# Patient Record
Sex: MALE | Race: ASIAN | NOT HISPANIC OR LATINO | URBAN - METROPOLITAN AREA
[De-identification: names, ages, dates, MRNs, and addresses within clinical notes are randomized per-mention and may not be internally consistent; named-entity substitution may affect disease eponyms.]

---

## 2018-11-01 ENCOUNTER — EMERGENCY (EMERGENCY)
Facility: HOSPITAL | Age: 43
LOS: 1 days | Discharge: HOME | End: 2018-11-01
Attending: EMERGENCY MEDICINE
Payer: COMMERCIAL

## 2018-11-01 VITALS
WEIGHT: 154.98 LBS | TEMPERATURE: 98 F | RESPIRATION RATE: 17 BRPM | DIASTOLIC BLOOD PRESSURE: 72 MMHG | SYSTOLIC BLOOD PRESSURE: 125 MMHG | OXYGEN SATURATION: 99 % | HEART RATE: 83 BPM | HEIGHT: 68 IN

## 2018-11-01 PROCEDURE — 99283 EMERGENCY DEPT VISIT LOW MDM: CPT

## 2018-11-01 PROCEDURE — 12001 RPR S/N/AX/GEN/TRNK 2.5CM/<: CPT

## 2018-11-02 PROCEDURE — 90471 IMMUNIZATION ADMIN: CPT

## 2018-11-02 PROCEDURE — 12001 RPR S/N/AX/GEN/TRNK 2.5CM/<: CPT

## 2018-11-02 PROCEDURE — 90715 TDAP VACCINE 7 YRS/> IM: CPT

## 2018-11-02 PROCEDURE — 99283 EMERGENCY DEPT VISIT LOW MDM: CPT | Mod: 25

## 2018-11-02 RX ORDER — TETANUS TOXOID, REDUCED DIPHTHERIA TOXOID AND ACELLULAR PERTUSSIS VACCINE, ADSORBED 5; 2.5; 8; 8; 2.5 [IU]/.5ML; [IU]/.5ML; UG/.5ML; UG/.5ML; UG/.5ML
0.5 SUSPENSION INTRAMUSCULAR ONCE
Qty: 0 | Refills: 0 | Status: COMPLETED | OUTPATIENT
Start: 2018-11-02 | End: 2018-11-02

## 2018-11-02 RX ADMIN — TETANUS TOXOID, REDUCED DIPHTHERIA TOXOID AND ACELLULAR PERTUSSIS VACCINE, ADSORBED 0.5 MILLILITER(S): 5; 2.5; 8; 8; 2.5 SUSPENSION INTRAMUSCULAR at 00:31

## 2018-11-02 NOTE — ED ADULT NURSE NOTE - INTEGUMENTARY WDL
Color consistent with ethnicity/race, warm, except for lac to right hand middle finger, dry intact, resilient.

## 2018-11-02 NOTE — ED PROVIDER NOTE - OBJECTIVE STATEMENT
44 y/o male.  tetanus NOT uptodate. presents with laceration to dorsal aspect of middle finger over the DIP joint while cutting with a clean knife.

## 2018-11-02 NOTE — ED PROVIDER NOTE - NSFOLLOWUPINSTRUCTIONS_ED_ALL_ED_FT
keep wound clean and dry. bacitracin twice a day.  keep finger in splint  Call Dr Gabriel (hand surgery) : [phone]

## 2018-11-02 NOTE — ED ADULT NURSE NOTE - CHPI ED NUR SYMPTOMS NEG
no weakness/no dizziness/no fever/no decreased eating/drinking/no nausea/no tingling/no vomiting/no chills

## 2021-02-22 ENCOUNTER — INPATIENT (INPATIENT)
Facility: HOSPITAL | Age: 46
LOS: 1 days | Discharge: HOME | DRG: 177 | End: 2021-02-24
Attending: HOSPITALIST | Admitting: HOSPITALIST
Payer: COMMERCIAL

## 2021-02-22 VITALS
HEART RATE: 93 BPM | RESPIRATION RATE: 16 BRPM | OXYGEN SATURATION: 91 % | HEIGHT: 68 IN | SYSTOLIC BLOOD PRESSURE: 102 MMHG | TEMPERATURE: 101 F | DIASTOLIC BLOOD PRESSURE: 70 MMHG

## 2021-02-22 PROCEDURE — 99285 EMERGENCY DEPT VISIT HI MDM: CPT

## 2021-02-22 RX ORDER — KETOROLAC TROMETHAMINE 30 MG/ML
15 SYRINGE (ML) INJECTION ONCE
Refills: 0 | Status: DISCONTINUED | OUTPATIENT
Start: 2021-02-22 | End: 2021-02-22

## 2021-02-22 RX ORDER — SODIUM CHLORIDE 9 MG/ML
1000 INJECTION INTRAMUSCULAR; INTRAVENOUS; SUBCUTANEOUS ONCE
Refills: 0 | Status: COMPLETED | OUTPATIENT
Start: 2021-02-22 | End: 2021-02-22

## 2021-02-22 RX ORDER — DEXAMETHASONE 0.5 MG/5ML
10 ELIXIR ORAL ONCE
Refills: 0 | Status: COMPLETED | OUTPATIENT
Start: 2021-02-22 | End: 2021-02-22

## 2021-02-23 DIAGNOSIS — B34.2 CORONAVIRUS INFECTION, UNSPECIFIED: ICD-10-CM

## 2021-02-23 DIAGNOSIS — E78.5 HYPERLIPIDEMIA, UNSPECIFIED: ICD-10-CM

## 2021-02-23 DIAGNOSIS — E87.6 HYPOKALEMIA: ICD-10-CM

## 2021-02-23 DIAGNOSIS — Z29.9 ENCOUNTER FOR PROPHYLACTIC MEASURES, UNSPECIFIED: ICD-10-CM

## 2021-02-23 DIAGNOSIS — U07.1 COVID-19: ICD-10-CM

## 2021-02-23 DIAGNOSIS — D64.9 ANEMIA, UNSPECIFIED: ICD-10-CM

## 2021-02-23 DIAGNOSIS — J96.01 ACUTE RESPIRATORY FAILURE WITH HYPOXIA: ICD-10-CM

## 2021-02-23 DIAGNOSIS — E87.1 HYPO-OSMOLALITY AND HYPONATREMIA: ICD-10-CM

## 2021-02-23 LAB
24R-OH-CALCIDIOL SERPL-MCNC: 32.2 NG/ML — SIGNIFICANT CHANGE UP (ref 30–80)
A1C WITH ESTIMATED AVERAGE GLUCOSE RESULT: 6 % — HIGH (ref 4–5.6)
ALBUMIN SERPL ELPH-MCNC: 3 G/DL — LOW (ref 3.5–5)
ALP SERPL-CCNC: 44 U/L — SIGNIFICANT CHANGE UP (ref 40–120)
ALT FLD-CCNC: 24 U/L DA — SIGNIFICANT CHANGE UP (ref 10–60)
ANION GAP SERPL CALC-SCNC: 6 MMOL/L — SIGNIFICANT CHANGE UP (ref 5–17)
ANION GAP SERPL CALC-SCNC: 7 MMOL/L — SIGNIFICANT CHANGE UP (ref 5–17)
APTT BLD: 39.9 SEC — HIGH (ref 27.5–35.5)
AST SERPL-CCNC: 19 U/L — SIGNIFICANT CHANGE UP (ref 10–40)
BASOPHILS # BLD AUTO: 0 K/UL — SIGNIFICANT CHANGE UP (ref 0–0.2)
BASOPHILS NFR BLD AUTO: 0 % — SIGNIFICANT CHANGE UP (ref 0–2)
BILIRUB SERPL-MCNC: 0.5 MG/DL — SIGNIFICANT CHANGE UP (ref 0.2–1.2)
BUN SERPL-MCNC: 9 MG/DL — SIGNIFICANT CHANGE UP (ref 7–18)
BUN SERPL-MCNC: 9 MG/DL — SIGNIFICANT CHANGE UP (ref 7–18)
CALCIUM SERPL-MCNC: 7.8 MG/DL — LOW (ref 8.4–10.5)
CALCIUM SERPL-MCNC: 8.2 MG/DL — LOW (ref 8.4–10.5)
CHLORIDE SERPL-SCNC: 104 MMOL/L — SIGNIFICANT CHANGE UP (ref 96–108)
CHLORIDE SERPL-SCNC: 98 MMOL/L — SIGNIFICANT CHANGE UP (ref 96–108)
CHOLEST SERPL-MCNC: 124 MG/DL — SIGNIFICANT CHANGE UP
CO2 SERPL-SCNC: 26 MMOL/L — SIGNIFICANT CHANGE UP (ref 22–31)
CO2 SERPL-SCNC: 27 MMOL/L — SIGNIFICANT CHANGE UP (ref 22–31)
CREAT SERPL-MCNC: 0.76 MG/DL — SIGNIFICANT CHANGE UP (ref 0.5–1.3)
CREAT SERPL-MCNC: 0.8 MG/DL — SIGNIFICANT CHANGE UP (ref 0.5–1.3)
CRP SERPL-MCNC: 3.65 MG/DL — HIGH (ref 0–0.4)
D DIMER BLD IA.RAPID-MCNC: 198 NG/ML DDU — SIGNIFICANT CHANGE UP
EOSINOPHIL # BLD AUTO: 0 K/UL — SIGNIFICANT CHANGE UP (ref 0–0.5)
EOSINOPHIL NFR BLD AUTO: 0 % — SIGNIFICANT CHANGE UP (ref 0–6)
ERYTHROCYTE [SEDIMENTATION RATE] IN BLOOD: 37 MM/HR — HIGH (ref 0–15)
ESTIMATED AVERAGE GLUCOSE: 126 MG/DL — HIGH (ref 68–114)
FERRITIN SERPL-MCNC: 655 NG/ML — HIGH (ref 30–400)
FERRITIN SERPL-MCNC: 662 NG/ML — HIGH (ref 30–400)
FOLATE SERPL-MCNC: 15 NG/ML — SIGNIFICANT CHANGE UP
GLUCOSE SERPL-MCNC: 107 MG/DL — HIGH (ref 70–99)
GLUCOSE SERPL-MCNC: 133 MG/DL — HIGH (ref 70–99)
HCT VFR BLD CALC: 38 % — LOW (ref 39–50)
HCT VFR BLD CALC: 38.2 % — LOW (ref 39–50)
HDLC SERPL-MCNC: 44 MG/DL — SIGNIFICANT CHANGE UP
HGB BLD-MCNC: 12.7 G/DL — LOW (ref 13–17)
HGB BLD-MCNC: 13 G/DL — SIGNIFICANT CHANGE UP (ref 13–17)
INR BLD: 1.13 RATIO — SIGNIFICANT CHANGE UP (ref 0.88–1.16)
IRON SATN MFR SERPL: 21 UG/DL — LOW (ref 65–170)
IRON SATN MFR SERPL: 8 % — LOW (ref 20–55)
LDH SERPL L TO P-CCNC: 273 U/L — HIGH (ref 120–225)
LIPID PNL WITH DIRECT LDL SERPL: 70 MG/DL — SIGNIFICANT CHANGE UP
LYMPHOCYTES # BLD AUTO: 0.74 K/UL — LOW (ref 1–3.3)
LYMPHOCYTES # BLD AUTO: 13 % — SIGNIFICANT CHANGE UP (ref 13–44)
MAGNESIUM SERPL-MCNC: 2.2 MG/DL — SIGNIFICANT CHANGE UP (ref 1.6–2.6)
MANUAL SMEAR VERIFICATION: SIGNIFICANT CHANGE UP
MCHC RBC-ENTMCNC: 29.1 PG — SIGNIFICANT CHANGE UP (ref 27–34)
MCHC RBC-ENTMCNC: 31.1 PG — SIGNIFICANT CHANGE UP (ref 27–34)
MCHC RBC-ENTMCNC: 33.2 GM/DL — SIGNIFICANT CHANGE UP (ref 32–36)
MCHC RBC-ENTMCNC: 34.2 GM/DL — SIGNIFICANT CHANGE UP (ref 32–36)
MCV RBC AUTO: 87.6 FL — SIGNIFICANT CHANGE UP (ref 80–100)
MCV RBC AUTO: 90.9 FL — SIGNIFICANT CHANGE UP (ref 80–100)
MONOCYTES # BLD AUTO: 0.51 K/UL — SIGNIFICANT CHANGE UP (ref 0–0.9)
MONOCYTES NFR BLD AUTO: 9 % — SIGNIFICANT CHANGE UP (ref 2–14)
NEUTROPHILS # BLD AUTO: 4.04 K/UL — SIGNIFICANT CHANGE UP (ref 1.8–7.4)
NEUTROPHILS NFR BLD AUTO: 71 % — SIGNIFICANT CHANGE UP (ref 43–77)
NON HDL CHOLESTEROL: 80 MG/DL — SIGNIFICANT CHANGE UP
NRBC # BLD: 0 /100 WBCS — SIGNIFICANT CHANGE UP (ref 0–0)
NRBC # BLD: 0 /100 — SIGNIFICANT CHANGE UP (ref 0–0)
PHOSPHATE SERPL-MCNC: 3 MG/DL — SIGNIFICANT CHANGE UP (ref 2.5–4.5)
PLAT MORPH BLD: NORMAL — SIGNIFICANT CHANGE UP
PLATELET # BLD AUTO: 169 K/UL — SIGNIFICANT CHANGE UP (ref 150–400)
PLATELET # BLD AUTO: 179 K/UL — SIGNIFICANT CHANGE UP (ref 150–400)
POTASSIUM SERPL-MCNC: 3.4 MMOL/L — LOW (ref 3.5–5.3)
POTASSIUM SERPL-MCNC: 4.3 MMOL/L — SIGNIFICANT CHANGE UP (ref 3.5–5.3)
POTASSIUM SERPL-SCNC: 3.4 MMOL/L — LOW (ref 3.5–5.3)
POTASSIUM SERPL-SCNC: 4.3 MMOL/L — SIGNIFICANT CHANGE UP (ref 3.5–5.3)
PROCALCITONIN SERPL-MCNC: 0.04 NG/ML — SIGNIFICANT CHANGE UP (ref 0.02–0.1)
PROT SERPL-MCNC: 6.7 G/DL — SIGNIFICANT CHANGE UP (ref 6–8.3)
PROTHROM AB SERPL-ACNC: 13.4 SEC — SIGNIFICANT CHANGE UP (ref 10.6–13.6)
RBC # BLD: 4.18 M/UL — LOW (ref 4.2–5.8)
RBC # BLD: 4.36 M/UL — SIGNIFICANT CHANGE UP (ref 4.2–5.8)
RBC # FLD: 11.4 % — SIGNIFICANT CHANGE UP (ref 10.3–14.5)
RBC # FLD: 11.8 % — SIGNIFICANT CHANGE UP (ref 10.3–14.5)
RBC BLD AUTO: NORMAL — SIGNIFICANT CHANGE UP
SARS-COV-2 IGG SERPL QL IA: NEGATIVE — SIGNIFICANT CHANGE UP
SARS-COV-2 IGM SERPL IA-ACNC: 0.47 INDEX — SIGNIFICANT CHANGE UP
SARS-COV-2 RNA SPEC QL NAA+PROBE: DETECTED
SODIUM SERPL-SCNC: 132 MMOL/L — LOW (ref 135–145)
SODIUM SERPL-SCNC: 136 MMOL/L — SIGNIFICANT CHANGE UP (ref 135–145)
TIBC SERPL-MCNC: 262 UG/DL — SIGNIFICANT CHANGE UP (ref 250–450)
TRIGL SERPL-MCNC: 51 MG/DL — SIGNIFICANT CHANGE UP
TROPONIN I SERPL-MCNC: <0.015 NG/ML — SIGNIFICANT CHANGE UP (ref 0–0.04)
TSH SERPL-MCNC: 1.08 UU/ML — SIGNIFICANT CHANGE UP (ref 0.34–4.82)
UIBC SERPL-MCNC: 241 UG/DL — SIGNIFICANT CHANGE UP (ref 110–370)
VARIANT LYMPHS # BLD: 7 % — HIGH (ref 0–6)
VIT B12 SERPL-MCNC: 605 PG/ML — SIGNIFICANT CHANGE UP (ref 232–1245)
WBC # BLD: 4.16 K/UL — SIGNIFICANT CHANGE UP (ref 3.8–10.5)
WBC # BLD: 5.69 K/UL — SIGNIFICANT CHANGE UP (ref 3.8–10.5)
WBC # FLD AUTO: 4.16 K/UL — SIGNIFICANT CHANGE UP (ref 3.8–10.5)
WBC # FLD AUTO: 5.69 K/UL — SIGNIFICANT CHANGE UP (ref 3.8–10.5)

## 2021-02-23 PROCEDURE — 71045 X-RAY EXAM CHEST 1 VIEW: CPT | Mod: 26

## 2021-02-23 PROCEDURE — 99223 1ST HOSP IP/OBS HIGH 75: CPT

## 2021-02-23 RX ORDER — ONDANSETRON 8 MG/1
4 TABLET, FILM COATED ORAL EVERY 6 HOURS
Refills: 0 | Status: DISCONTINUED | OUTPATIENT
Start: 2021-02-23 | End: 2021-02-24

## 2021-02-23 RX ORDER — POTASSIUM CHLORIDE 20 MEQ
40 PACKET (EA) ORAL ONCE
Refills: 0 | Status: COMPLETED | OUTPATIENT
Start: 2021-02-23 | End: 2021-02-23

## 2021-02-23 RX ORDER — ACETAMINOPHEN 500 MG
650 TABLET ORAL EVERY 6 HOURS
Refills: 0 | Status: DISCONTINUED | OUTPATIENT
Start: 2021-02-23 | End: 2021-02-24

## 2021-02-23 RX ORDER — ROSUVASTATIN CALCIUM 5 MG/1
1 TABLET ORAL
Qty: 0 | Refills: 0 | DISCHARGE

## 2021-02-23 RX ORDER — ATORVASTATIN CALCIUM 80 MG/1
20 TABLET, FILM COATED ORAL AT BEDTIME
Refills: 0 | Status: DISCONTINUED | OUTPATIENT
Start: 2021-02-23 | End: 2021-02-24

## 2021-02-23 RX ORDER — ENOXAPARIN SODIUM 100 MG/ML
40 INJECTION SUBCUTANEOUS DAILY
Refills: 0 | Status: DISCONTINUED | OUTPATIENT
Start: 2021-02-23 | End: 2021-02-24

## 2021-02-23 RX ORDER — DEXAMETHASONE 0.5 MG/5ML
6 ELIXIR ORAL DAILY
Refills: 0 | Status: DISCONTINUED | OUTPATIENT
Start: 2021-02-23 | End: 2021-02-24

## 2021-02-23 RX ADMIN — ATORVASTATIN CALCIUM 20 MILLIGRAM(S): 80 TABLET, FILM COATED ORAL at 22:02

## 2021-02-23 RX ADMIN — SODIUM CHLORIDE 1000 MILLILITER(S): 9 INJECTION INTRAMUSCULAR; INTRAVENOUS; SUBCUTANEOUS at 01:21

## 2021-02-23 RX ADMIN — SODIUM CHLORIDE 1000 MILLILITER(S): 9 INJECTION INTRAMUSCULAR; INTRAVENOUS; SUBCUTANEOUS at 01:20

## 2021-02-23 RX ADMIN — Medication 15 MILLIGRAM(S): at 01:20

## 2021-02-23 RX ADMIN — Medication 102 MILLIGRAM(S): at 01:21

## 2021-02-23 RX ADMIN — ENOXAPARIN SODIUM 40 MILLIGRAM(S): 100 INJECTION SUBCUTANEOUS at 13:56

## 2021-02-23 RX ADMIN — SODIUM CHLORIDE 1000 MILLILITER(S): 9 INJECTION INTRAMUSCULAR; INTRAVENOUS; SUBCUTANEOUS at 01:22

## 2021-02-23 RX ADMIN — Medication 10 MILLIGRAM(S): at 01:21

## 2021-02-23 RX ADMIN — Medication 6 MILLIGRAM(S): at 22:02

## 2021-02-23 RX ADMIN — Medication 40 MILLIEQUIVALENT(S): at 04:35

## 2021-02-23 NOTE — H&P ADULT - HISTORY OF PRESENT ILLNESS
Patient Patient is a 45 year old male from home, with PMH of HLD, who presented to the ED due to hypoxia. Patient states he was tested positive for COVID on 2/15/21. Patient has been having cough, fever and diarrhea for the past week. Patient states he has been saturating around 95% on room air but noted to be saturating around 92% yesterday which prompted him to come to the ED. Patient denies any chest pain, nausea, vomiting or body aches. States that he lives with his family and they are all doing well currently.

## 2021-02-23 NOTE — H&P ADULT - NSHPSOCIALHISTORY_GEN_ALL_CORE
Patient lives at home with his wife and children. Denies any smoking or use of illicit drugs. Social alcohol use.

## 2021-02-23 NOTE — CHART NOTE - NSCHARTNOTEFT_GEN_A_CORE
Patient is a 45 year old male from home, with PMH of HLD, who presented with shortness of breath, subjective fever  and low oxygen saturation at home   In ED oxygen sat noted to be 92% on RA   Placed on supplemental oxygen 2L with improvement to 96%   COVID 19 positive   CXR clear   Started on dexamethasone   Admitted for acute respiratory failure with hypoxia likely due to COVID PNA   Pt seen at bedside, sitting up in chair, NAD states is feeling better, saturating above 96% on 2 L NC. Denies chest pain, fever, SOB. NO leucocytosis.       OBJECTIVE:  Vital Signs Last 24 Hrs  T(C): 36.5 (23 Feb 2021 15:00), Max: 38.4 (22 Feb 2021 22:13)  T(F): 97.7 (23 Feb 2021 15:00), Max: 101.1 (22 Feb 2021 22:13)  HR: 66 (23 Feb 2021 15:00) (63 - 93)  BP: 124/67 (23 Feb 2021 15:00) (100/63 - 124/67)  BP(mean): --  RR: 18 (23 Feb 2021 15:00) (16 - 18)  SpO2: 98% (23 Feb 2021 15:00) (91% - 98%)    FOCUSED PHYSICAL EXAM:  Neuro: awake, alert, oriented x 3. No neuro deficit  Cardiovascular: Pulses +2 B/L in lower and upper extremities, HR regular, BP stable, No edema.  Respiratory: Respirations regular, unlabored, breath sounds clear B/L.   GI: Abdomen soft, non-tender, positive bowel sounds.  : no bladder distention noted. No complaints at this time.  Skin: Dry, intact, no bruising, no diaphoresis.    I&O's      LABS:                        13.0   4.16  )-----------( 179      ( 23 Feb 2021 05:31 )             38.0   CARDIAC MARKERS ( 23 Feb 2021 05:31 )  <0.015 ng/mL / x     / x     / x     / x        02-23    136  |  104  |  9   ----------------------------<  133<H>  4.3   |  26  |  0.80    Ca    7.8<L>      23 Feb 2021 05:31  Phos  3.0     02-23  Mg     2.2     02-23    TPro  6.7  /  Alb  3.0<L>  /  TBili  0.5  /  DBili  x   /  AST  19  /  ALT  24  /  AlkPhos  44  02-23            ASSESSMENT:    Patient is a 45 year old male from home, with PMH of HLD, who presented with shortness of breath, subjective fever  and low oxygen saturation at home   In ED oxygen sat noted to be 92% on RA   Admitted for acute respiratory failure with hypoxia likely due to COVID PNA     PLAN:       1. Acute respiratory failure with hypoxia due to COVID PNA   c/w Dexamethasone 6mg IV daily   airborne precaution   Supplemental O2 to keep Sao2 > 96%  COVID antibodies negative, Consider remdesivir protocol   Supportive care     2. Electrolyte imbalance  presented with hypokalemia and hyponatremia  now K and NA WNL   monitor BMP     3. Hyperlipidemia   continue atorvastatin   f/u lipid panel   DASH diet     4. Prophylactic measures Patient is a 45 year old male from home, with PMH of HLD, who presented with shortness of breath, subjective fever  and low oxygen saturation at home. Tested positive for COVID on 2/15 (symptoms started on 2/13)   In ED oxygen sat noted to be 92% on RA   Placed on supplemental oxygen 2L with improvement to 96%   COVID 19 positive   CXR clear   Started on dexamethasone   Admitted for acute respiratory failure with hypoxia likely due to COVID PNA   Pt seen at bedside, sitting up in chair, NAD states is feeling better, saturating above 98% on 2 L NC, off oxygen 96%. Denies chest pain, fever, SOB. NO leucocytosis.       OBJECTIVE:  Vital Signs Last 24 Hrs  T(C): 36.5 (23 Feb 2021 15:00), Max: 38.4 (22 Feb 2021 22:13)  T(F): 97.7 (23 Feb 2021 15:00), Max: 101.1 (22 Feb 2021 22:13)  HR: 66 (23 Feb 2021 15:00) (63 - 93)  BP: 124/67 (23 Feb 2021 15:00) (100/63 - 124/67)  BP(mean): --  RR: 18 (23 Feb 2021 15:00) (16 - 18)  SpO2: 98% (23 Feb 2021 15:00) (91% - 98%)    FOCUSED PHYSICAL EXAM:  Neuro: awake, alert, oriented x 3. No neuro deficit  Cardiovascular: Pulses +2 B/L in lower and upper extremities, HR regular, BP stable, No edema.  Respiratory: Respirations regular, unlabored, breath sounds clear B/L.   GI: Abdomen soft, non-tender, positive bowel sounds.  : no bladder distention noted. No complaints at this time.  Skin: Dry, intact, no bruising, no diaphoresis.    I&O's      LABS:                        13.0   4.16  )-----------( 179      ( 23 Feb 2021 05:31 )             38.0   CARDIAC MARKERS ( 23 Feb 2021 05:31 )  <0.015 ng/mL / x     / x     / x     / x        02-23    136  |  104  |  9   ----------------------------<  133<H>  4.3   |  26  |  0.80    Ca    7.8<L>      23 Feb 2021 05:31  Phos  3.0     02-23  Mg     2.2     02-23    TPro  6.7  /  Alb  3.0<L>  /  TBili  0.5  /  DBili  x   /  AST  19  /  ALT  24  /  AlkPhos  44  02-23            ASSESSMENT:    Patient is a 45 year old male from home, with PMH of HLD, who presented with shortness of breath, subjective fever  and low oxygen saturation at home   In ED oxygen sat noted to be 92% on RA   Admitted for acute respiratory failure with hypoxia likely due to COVID PNA     PLAN:       1. Acute respiratory failure with hypoxia due to COVID PNA   c/w Dexamethasone 6mg IV daily   airborne precaution   Supplemental O2 to keep Sao2 > 96%  COVID antibodies negative,   Supportive care     2. Electrolyte imbalance  presented with hypokalemia and hyponatremia  now K and NA WNL   monitor BMP     3. Hyperlipidemia   continue atorvastatin   f/u lipid panel   DASH diet     4. Prophylactic measures Patient is a 45 year old male from home, with PMH of HLD, who presented with shortness of breath, subjective fever  and low oxygen saturation at home. Tested positive for COVID on 2/15 (symptoms started on 2/13)   In ED oxygen sat noted to be 92% on RA   Placed on supplemental oxygen 2L with improvement to 96%   COVID 19 positive   CXR clear   Started on dexamethasone   Admitted for acute respiratory failure with hypoxia likely due to COVID PNA   Pt seen at bedside, sitting up in chair, NAD states is feeling better, saturating above 98% on 2 L NC, off oxygen 96%. Denies chest pain, fever, SOB. NO leucocytosis.       OBJECTIVE:  Vital Signs Last 24 Hrs  T(C): 36.5 (23 Feb 2021 15:00), Max: 38.4 (22 Feb 2021 22:13)  T(F): 97.7 (23 Feb 2021 15:00), Max: 101.1 (22 Feb 2021 22:13)  HR: 66 (23 Feb 2021 15:00) (63 - 93)  BP: 124/67 (23 Feb 2021 15:00) (100/63 - 124/67)  BP(mean): --  RR: 18 (23 Feb 2021 15:00) (16 - 18)  SpO2: 98% (23 Feb 2021 15:00) (91% - 98%)    FOCUSED PHYSICAL EXAM:  Neuro: awake, alert, oriented x 3. No neuro deficit  Cardiovascular: Pulses +2 B/L in lower and upper extremities, HR regular, BP stable, No edema.  Respiratory: Respirations regular, unlabored, breath sounds clear B/L.   GI: Abdomen soft, non-tender, positive bowel sounds.  : no bladder distention noted. No complaints at this time.  Skin: Dry, intact, no bruising, no diaphoresis.    I&O's      LABS:                        13.0   4.16  )-----------( 179      ( 23 Feb 2021 05:31 )             38.0   CARDIAC MARKERS ( 23 Feb 2021 05:31 )  <0.015 ng/mL / x     / x     / x     / x        02-23    136  |  104  |  9   ----------------------------<  133<H>  4.3   |  26  |  0.80    Ca    7.8<L>      23 Feb 2021 05:31  Phos  3.0     02-23  Mg     2.2     02-23    TPro  6.7  /  Alb  3.0<L>  /  TBili  0.5  /  DBili  x   /  AST  19  /  ALT  24  /  AlkPhos  44  02-23            ASSESSMENT:    Patient is a 45 year old male from home, with PMH of HLD, who presented with shortness of breath, subjective fever  and low oxygen saturation at home   In ED oxygen sat noted to be 92% on RA   Admitted for acute respiratory failure with hypoxia likely due to COVID PNA     PLAN:       1. Acute respiratory failure with hypoxia due to COVID PNA   c/w Dexamethasone 6mg IV daily   airborne precaution   Supplemental O2 to keep Sao2 > 96%  COVID antibodies negative,   Supportive care     2. Electrolyte imbalance  presented with hypokalemia and hyponatremia  now K and NA WNL   monitor BMP     3. Hyperlipidemia   continue atorvastatin   f/u lipid panel   DASH diet     4. Prophylactic measures  Lovenox        ATTENDING ATTESTATION:  Admission H&P from this morning reviewed. Patient is a 46 y/o male with a PMH of HLD for mild acute hypoxic respiratory failure secondary to COVID pneumonia. Continue dexamethasone for now, O2 sats already improved, was 92% on admission, now in the high 90s on RA, hold off Remdesivir. Continue gentle IVF for hyponatremia. Will monitor overnight on room air, if he remains stable can discharge home tomorrow. Symptomatic since 2/13.    Rest as per NP's note above and admission H&P.

## 2021-02-23 NOTE — H&P ADULT - ASSESSMENT
Patient Patient is a 45 year old male from home, with PMH of HLD, who presented to the ED due to hypoxia.    Noted to have fever of 101.1 in ED. Saturating at 91% on room air. Noted to be saturating around 92-93% on ambulation as per ED provider. Saturating well on 2L nasal cannula. Sodium of 132. Potassium of 3.4. D-dimer of 198. COVID positive. Given 2L NS bolus, 10mg decadron and dose of toradol in ED.     Admitted for hypoxic respiratory failure secondary to COVID PNA.

## 2021-02-23 NOTE — ED ADULT NURSE NOTE - OBJECTIVE STATEMENT
Patient presented to the ED with c/o shortness of breath, cough and  fever. Denies any body ache, lossof taste and smell.

## 2021-02-23 NOTE — H&P ADULT - PROBLEM SELECTOR PLAN 5
patient on rosuvastatin 5mg at home  continue with atorvastatin (therapeutic interchange)  f/u lipid panel noted to have sodium of 132 on admission   given 2L NS bolus in ED  monitor BMP daily

## 2021-02-23 NOTE — ED PROVIDER NOTE - OBJECTIVE STATEMENT
46 y/o male h/o HLD, tested positive for covid on February 15th, coming in with shortness of breath, chest pain, cough, and fever since yesterday. Reports O2 saturation at home 90-91% on RA. No smoking. No drug use.

## 2021-02-23 NOTE — H&P ADULT - PROBLEM SELECTOR PLAN 1
patient COVID positive since 2/15  presented to ED due to hypoxia  given 10mg decadron in ED  noted to be saturating around 91% on room air; 92-93% on ambulation   currently saturating well on 2L nasal cannula  continue decadron 6mg  monitor O2 sats and taper off as tolerable   isolation precautions  zofran and tylenol PRN

## 2021-02-23 NOTE — H&P ADULT - PROBLEM SELECTOR PLAN 7
patient on rosuvastatin 5mg at home  continue with atorvastatin (therapeutic interchange)  f/u lipid panel

## 2021-02-23 NOTE — H&P ADULT - ATTENDING COMMENTS
Pt seen and examined.  Case discussed with MAR  45 zach old man with PMH of HLD presenting with progressive fever, cough and hypoxia.  Tested +ve for CoVID 19 infection a week ago.    Vital Signs Last 24 Hrs  T(C): 38.4 (22 Feb 2021 22:13), Max: 38.4 (22 Feb 2021 22:13)  T(F): 101.1 (22 Feb 2021 22:13), Max: 101.1 (22 Feb 2021 22:13)  HR: 93 (22 Feb 2021 22:13) (93 - 93)  BP: 102/70 (22 Feb 2021 22:13) (102/70 - 102/70)-  RR: 16 (22 Feb 2021 22:13) (16 - 16)  SpO2: 91% (22 Feb 2021 22:13) (91% - 91%)    Labs                        12.7   5.69  )-----------( 169      ( 23 Feb 2021 01:23 )             38.2     02-23    132<L>  |  98  |  9   ----------------------------<  107<H>  3.4<L>   |  27  |  0.76    Ca    8.2<L>      23 Feb 2021 01:23  TPro  6.7  /  Alb  3.0<L>  /  TBili  0.5  /  DBili  x   /  AST  19  /  ALT  24  /  AlkPhos  44  02-23    COVID-19 PCR: Detected (23 Feb 2021 01:23)  CXR - independent assessment - mild interstitial infiltrates B/L     Impression  - Acute respiratory failure with hypoxia  - COvid 19 pneumonia with moderate disease    Plan  - Admit to airborne and contact isolation room   - Supplemental O2 to keep Sao2 > 96%  - Dexamethasone 6mg IV daily   - Consider remdesivir protocol   - Supportive care -   - Monitor closely for worsening symptoms or increased O2 demand

## 2021-02-23 NOTE — ED PROVIDER NOTE - PROGRESS NOTE DETAILS
Jeong: signed out by Dr. Thayer. Pending labs. Ambulatory pulse ox 92-93%. Offered admission and patient agreeable. Endorsed to Dr. Richardson.

## 2021-02-23 NOTE — H&P ADULT - PROBLEM SELECTOR PLAN 2
noted to have potassium of 3.4 on admission  supplement ordered  monitor BMP and replace electrolytes as needed patient COVID positive since 2/15  presented to ED due to hypoxia  given 10mg decadron in ED  noted to be saturating around 91% on room air; 92-93% on ambulation   currently saturating well on 2L nasal cannula  continue decadron 6mg  monitor O2 sats and taper off as tolerable   isolation precautions  zofran and tylenol PRN

## 2021-02-23 NOTE — H&P ADULT - PROBLEM SELECTOR PLAN 4
noted to have Hgb of 12.7 on admission   unknown baseline  f/u anemia panel  monitor CBC daily noted to have potassium of 3.4 on admission  supplement ordered  monitor BMP and replace electrolytes as needed

## 2021-02-23 NOTE — H&P ADULT - PROBLEM SELECTOR PLAN 3
noted to have sodium of 132 on admission   given 2L NS bolus in ED  monitor BMP daily patient COVID positive since 2/15  presented to ED due to hypoxia  given 10mg decadron in ED  noted to be saturating around 91% on room air; 92-93% on ambulation   currently saturating well on 2L nasal cannula  continue decadron 6mg  monitor O2 sats and taper off as tolerable   isolation precautions  zofran and tylenol PRN

## 2021-02-23 NOTE — ED PROVIDER NOTE - NS ED MD DISPO DIVISION
Cardiology calling to inquire necessity of Echo Stress test. Writer advised this was not an emergent test and can be rescheduled. Message handled by Nurse Triage with [facility] - provider name: YISEL. Ok with above.    Fan Kwok RN   [facility] [place] Triage      
[facility]

## 2021-02-23 NOTE — H&P ADULT - PROBLEM SELECTOR PLAN 6
lovenox for DVT prophylaxis noted to have Hgb of 12.7 on admission   unknown baseline  f/u anemia panel  monitor CBC daily

## 2021-02-23 NOTE — PATIENT PROFILE ADULT - IS THERE A SUSPICION OF ABUSE/NEGLIGENCE?
What is the Covid-19 Coronavirus?        Covid-19 is a new strain of the common cold virus called Coronavirus.  It has spread quickly around the world and has been found in most countries.  It has been considered a national emergency due to its fast spread and ability to make older people and those with diabetes, lung disease, and heart disease very sick.    What are the symptoms?    Fever, Cough, and Shortness of Breath are the 3 most common symptoms most patients with Covid-19 are experiencing.  Usually symptoms start 2-14 days after exposure to the virus.  Other symptoms can be similar to the common cold such as nasal congestion, runny nose, headache, and sore throat.     When should I seek medical care?  If you or your loved one is experiencing only the main three symptoms mentioned above, call the [facility] hotline at 1-631.958.1180 for advice and direction on what to do next.  Most young and healthy people with mild symptoms are able to stay home and treat symptomatically with the below treatment recommendations.  Most people will not need to be tested and our hotline and clinics can help you determine if you need testing.      If you or a loved one experiences any of the following emergency warning signs seek care at your nearest emergency department.  • Difficulty breathing or shortness of breath that doesn't improve  • Persistent pain or pressure in the chest  • New confusion or inability to wake up  • Blue lips or face    How do I get it and how do I prevent it?  We believe the virus is spread from person to person in close contact (within 6 feet) via respiratory droplets from a sneeze or cough.  Kissing and touching infected surfaces with hand-to-mouth activity afterwards may also help spread the virus.      There is no vaccine to help prevent this virus.  Therefore, good hygiene, isolation, and living a healthy lifestyle is the best way to prevent infection as well as severe symptoms. Below are ways  you can help prevent infection and the spread of the virus:  • Wash your hands (soap and water) regularly, especially before meals and after coming in contact with someone  • Cover your cough's and sneezes with your elbow or a tissue  • Avoid contact with people who are showing the common cold symptoms mentioned above  • Avoid large gatherings of 25+ people and only plan for necessary trips to stores and public places  • Quit smoking tobacco and limit alcohol use to a drink per day maximum due to the immune suppression of these habits  • Walk outside (if possible) or get other low intensity exercise daily as this has been shown to boost immunity  • Eat 2-3 fruits, 5-7 vegetables, a 1/2 cup of legumes, a cup of whole grains, and a handful of nuts every day along with 3-4oz of lean meat or fish every few days.  Limit sugar-filled, processed, and fried foods.  This approach to nutrition has shown to support the immune system best.    • Drink 60-75oz of water daily and avoid sodas, sweetened teas, juices, and other sugar containing drinks which have been shown to suppress the immune system  • Sleep 7-8 hours every night.  If you have trouble falling or staying asleep, discuss further with your primary care doctor.      What is the treatment for Covid-19?  There is no specific treatment for this Covid-19 infection other than supportive treatments we recommend for the common cold.  Currently there is some concern over treating a low-grade fever (under 102F), so it might be best to allow the fever to take its course as long as you or your loved one can manage the discomfort.  Integrative treatments that can help improve symptoms and potentially reduce severity of the illness include:  • Normal Saline Nasal Sprays/Sinus Rinse/Neti Pot: Use 1 to 2 times per day to help with nasal congestion, dryness, postnasal drip, and runny nose. Ask your primary care provider for more information on these rinses.    • Humidifiers/Vaporizers: Adds moisture to the air, which helps ease coughing and congestion. Mucus tends to pull in the extra moisture, which thins it out and makes it easier to expel from the body.  Adding essential oils to a diffuser is often helpful, try Eucalyptus and Tea Tree Oil.  • Vicks VapoRub, Mentholatum: Contain a mixture of camphor, menthol, and eucalyptus for cough and congestion. When these products are inhaled, they create a local anesthetic sensation and a sense of improved airflow.   • Warm Steam Showers/Baths: The warm mist from the steamy bathroom relaxes the airways, loosens mucus, and allows for easier breathing. A nice big pot of soup on the stove helps too!   • Honey: Helps to relieve cough and relieves throat irritation. Use 1/2 to 2 teaspoons (tsp).  It's great to add to hot (decaffeinated) tea=more humidity! Warning:  Do not give honey to children under one year old.   • Gargle with warm salt water: Helps relieve sore throat.  Mix 1/4 to 1/2 teaspoon (tsp) salt with 1 cup (8 ounces) of warm water.   • Vitamin D3: The vitamin is needed to boost our immune system and help our bodies fight off infection.  Extra supplements during the winter and for prevention of illness.  For Prevention try 5,000-10,000 international units daily. If however, you are experiencing Covid-19 symptoms mentioned above, it is currently recommended you STOP Vitamin D supplementation due to potential to worsen symptoms.  • Use Gravity: Elevating the head above the heart helps decrease congestion, which is primarily caused by swollen blood vessels in the lining of the nose.  • Consider Other Supplements:   o Vitamin C: 500-3000mg daily  o Probiotics: 30-50 Billion live organisms daily with food  o Zinc: 25-50mg two times daily  o Oscillococcinum®: Take as package recommends        no

## 2021-02-24 VITALS
TEMPERATURE: 97 F | OXYGEN SATURATION: 98 % | SYSTOLIC BLOOD PRESSURE: 104 MMHG | RESPIRATION RATE: 18 BRPM | DIASTOLIC BLOOD PRESSURE: 56 MMHG | HEART RATE: 60 BPM

## 2021-02-24 LAB
ANION GAP SERPL CALC-SCNC: 8 MMOL/L — SIGNIFICANT CHANGE UP (ref 5–17)
BUN SERPL-MCNC: 12 MG/DL — SIGNIFICANT CHANGE UP (ref 7–18)
CALCIUM SERPL-MCNC: 8.3 MG/DL — LOW (ref 8.4–10.5)
CHLORIDE SERPL-SCNC: 104 MMOL/L — SIGNIFICANT CHANGE UP (ref 96–108)
CO2 SERPL-SCNC: 25 MMOL/L — SIGNIFICANT CHANGE UP (ref 22–31)
CREAT SERPL-MCNC: 0.66 MG/DL — SIGNIFICANT CHANGE UP (ref 0.5–1.3)
GLUCOSE SERPL-MCNC: 155 MG/DL — HIGH (ref 70–99)
HCT VFR BLD CALC: 38.3 % — LOW (ref 39–50)
HGB BLD-MCNC: 12.9 G/DL — LOW (ref 13–17)
MCHC RBC-ENTMCNC: 29.9 PG — SIGNIFICANT CHANGE UP (ref 27–34)
MCHC RBC-ENTMCNC: 33.7 GM/DL — SIGNIFICANT CHANGE UP (ref 32–36)
MCV RBC AUTO: 88.7 FL — SIGNIFICANT CHANGE UP (ref 80–100)
NRBC # BLD: 0 /100 WBCS — SIGNIFICANT CHANGE UP (ref 0–0)
PLATELET # BLD AUTO: 233 K/UL — SIGNIFICANT CHANGE UP (ref 150–400)
POTASSIUM SERPL-MCNC: 4.1 MMOL/L — SIGNIFICANT CHANGE UP (ref 3.5–5.3)
POTASSIUM SERPL-SCNC: 4.1 MMOL/L — SIGNIFICANT CHANGE UP (ref 3.5–5.3)
RBC # BLD: 4.32 M/UL — SIGNIFICANT CHANGE UP (ref 4.2–5.8)
RBC # FLD: 11.7 % — SIGNIFICANT CHANGE UP (ref 10.3–14.5)
SODIUM SERPL-SCNC: 137 MMOL/L — SIGNIFICANT CHANGE UP (ref 135–145)
WBC # BLD: 6.45 K/UL — SIGNIFICANT CHANGE UP (ref 3.8–10.5)
WBC # FLD AUTO: 6.45 K/UL — SIGNIFICANT CHANGE UP (ref 3.8–10.5)

## 2021-02-24 PROCEDURE — U0005: CPT

## 2021-02-24 PROCEDURE — 80061 LIPID PANEL: CPT

## 2021-02-24 PROCEDURE — 82728 ASSAY OF FERRITIN: CPT

## 2021-02-24 PROCEDURE — 84484 ASSAY OF TROPONIN QUANT: CPT

## 2021-02-24 PROCEDURE — 82746 ASSAY OF FOLIC ACID SERUM: CPT

## 2021-02-24 PROCEDURE — 93005 ELECTROCARDIOGRAM TRACING: CPT

## 2021-02-24 PROCEDURE — 86140 C-REACTIVE PROTEIN: CPT

## 2021-02-24 PROCEDURE — 84145 PROCALCITONIN (PCT): CPT

## 2021-02-24 PROCEDURE — 71045 X-RAY EXAM CHEST 1 VIEW: CPT

## 2021-02-24 PROCEDURE — 83036 HEMOGLOBIN GLYCOSYLATED A1C: CPT

## 2021-02-24 PROCEDURE — 83615 LACTATE (LD) (LDH) ENZYME: CPT

## 2021-02-24 PROCEDURE — 82607 VITAMIN B-12: CPT

## 2021-02-24 PROCEDURE — 84100 ASSAY OF PHOSPHORUS: CPT

## 2021-02-24 PROCEDURE — 83540 ASSAY OF IRON: CPT

## 2021-02-24 PROCEDURE — 99285 EMERGENCY DEPT VISIT HI MDM: CPT | Mod: 25

## 2021-02-24 PROCEDURE — 85610 PROTHROMBIN TIME: CPT

## 2021-02-24 PROCEDURE — 85730 THROMBOPLASTIN TIME PARTIAL: CPT

## 2021-02-24 PROCEDURE — 85025 COMPLETE CBC W/AUTO DIFF WBC: CPT

## 2021-02-24 PROCEDURE — 36415 COLL VENOUS BLD VENIPUNCTURE: CPT

## 2021-02-24 PROCEDURE — 85652 RBC SED RATE AUTOMATED: CPT

## 2021-02-24 PROCEDURE — 82306 VITAMIN D 25 HYDROXY: CPT

## 2021-02-24 PROCEDURE — 85379 FIBRIN DEGRADATION QUANT: CPT

## 2021-02-24 PROCEDURE — 85027 COMPLETE CBC AUTOMATED: CPT

## 2021-02-24 PROCEDURE — 83550 IRON BINDING TEST: CPT

## 2021-02-24 PROCEDURE — 86769 SARS-COV-2 COVID-19 ANTIBODY: CPT

## 2021-02-24 PROCEDURE — 99239 HOSP IP/OBS DSCHRG MGMT >30: CPT | Mod: GC

## 2021-02-24 PROCEDURE — 87635 SARS-COV-2 COVID-19 AMP PRB: CPT

## 2021-02-24 PROCEDURE — 83735 ASSAY OF MAGNESIUM: CPT

## 2021-02-24 PROCEDURE — 84443 ASSAY THYROID STIM HORMONE: CPT

## 2021-02-24 PROCEDURE — 80048 BASIC METABOLIC PNL TOTAL CA: CPT

## 2021-02-24 PROCEDURE — 80053 COMPREHEN METABOLIC PANEL: CPT

## 2021-02-24 RX ADMIN — ENOXAPARIN SODIUM 40 MILLIGRAM(S): 100 INJECTION SUBCUTANEOUS at 11:13

## 2021-02-24 RX ADMIN — Medication 6 MILLIGRAM(S): at 05:51

## 2021-02-24 NOTE — DISCHARGE NOTE PROVIDER - HOSPITAL COURSE
Patient is a 45 year old male from home, with PMH of HLD, who presented to the ED due to hypoxia. Patient states he was tested positive for COVID on 2/15/21. Patient has been having cough, fever and diarrhea for the past week. Patient states he has been saturating around 95% on room air but noted to be saturating around 92% a couple days prior to admission which prompted him to come to the ED. Patient denies any chest pain, nausea, vomiting or body aches. States that he lives with his family who are all COVID positive as well.    Pt's oxygen saturation was monitored for over 24 hours, and patient has remained off oxygen. Oxygen saturation on ambulation is 100%, hence pt is stable for discharge.   Advised to quarantine until Feb 27 to complete 14 days of quarantine from the day he started having symptoms.  Attending informed.

## 2021-02-24 NOTE — DISCHARGE NOTE NURSING/CASE MANAGEMENT/SOCIAL WORK - PATIENT PORTAL LINK FT
You can access the FollowMyHealth Patient Portal offered by [facility] by registering at the following website: [email or web address]. By joining [facility]’s FollowMyHealth portal, you will also be able to view your health information using other applications (apps) compatible with our system.

## 2021-02-24 NOTE — DISCHARGE NOTE PROVIDER - NSDCCPCAREPLAN_GEN_ALL_CORE_FT
PRINCIPAL DISCHARGE DIAGNOSIS  Diagnosis: 2019 novel coronavirus disease (COVID-19)  Assessment and Plan of Treatment: You were admitted for COVID-19 infection, and did not require oxygen supplementation during your hospital stay. You oxygen saturation on walking has remained above 95%   You are advised to self-isolate at home until February 27th to finish a 14 day course of quarantine.   CORONAVIRUS INSTRUCTIONS:   Based on your current clinical status and stability, it has been determined that you no longer need hospitalization and can recover while remaining in self-quarantine at home. You should follow the prevention steps below until a healthcare provider or local or state health department says you can return to your normal activities.   1. You should restrict activities outside your home, except for getting medical care.   2. Do not go to work, school, or public areas.   3. Avoid using public transportation, ride-sharing, or taxis.   4. Separate yourself from other people and animals in your home as much as possible.  When you are around other people (e.g., sharing a room or vehicle) you should wear a facemask.  5. Wash your hands often with soap and water for at least 20 seconds, especially after blowing your nose, coughing, or sneezing; going to the bathroom; and before eating or preparing food.  6. Cover your mouth and nose with a tissue when you cough or sneeze. Throw used tissues in a lined trash can. Immediately wash your hands with soap and water for at least 20 seconds  7. High touch surfaces include counters, tabletops, doorknobs, bathroom fixtures, toilets, phones, keyboards, tablets, and bedside tables.  8. Avoid sharing dishes, drinking glasses, cups, eating utensils, towels, or bedding with other people or pets in your home. After using these items, they should be washed thoroughly with soap and water.  You are strongly advised to seek prompt medical attention if your illness worsens or you develop new symptoms like fever or difficulty breathing.         PRINCIPAL DISCHARGE DIAGNOSIS  Diagnosis: 2019 novel coronavirus disease (COVID-19)  Assessment and Plan of Treatment: You were admitted for COVID-19 infection, and did not require oxygen supplementation during your hospital stay. You oxygen saturation on walking has remained above 95%   You are advised to self-isolate at home UNTIL February 27th to finish a 14 day course of quarantine.   CORONAVIRUS INSTRUCTIONS:   Based on your current clinical status and stability, it has been determined that you no longer need hospitalization and can recover while remaining in self-quarantine at home. You should follow the prevention steps below until a healthcare provider or local or state health department says you can return to your normal activities.   1. You should restrict activities outside your home, except for getting medical care.   2. Do not go to work, school, or public areas.   3. Avoid using public transportation, ride-sharing, or taxis.   4. Separate yourself from other people and animals in your home as much as possible.  When you are around other people (e.g., sharing a room or vehicle) you should wear a facemask.  5. Wash your hands often with soap and water for at least 20 seconds, especially after blowing your nose, coughing, or sneezing; going to the bathroom; and before eating or preparing food.  6. Cover your mouth and nose with a tissue when you cough or sneeze. Throw used tissues in a lined trash can. Immediately wash your hands with soap and water for at least 20 seconds  7. High touch surfaces include counters, tabletops, doorknobs, bathroom fixtures, toilets, phones, keyboards, tablets, and bedside tables.  8. Avoid sharing dishes, drinking glasses, cups, eating utensils, towels, or bedding with other people or pets in your home. After using these items, they should be washed thoroughly with soap and water.  You are strongly advised to seek prompt medical attention if your illness worsens or you develop new symptoms like fever or difficulty breathing.        SECONDARY DISCHARGE DIAGNOSES  Diagnosis: Prediabetes  Assessment and Plan of Treatment: Your Hemoglobin A1C (HbA1C) was found to be 6.0, which shows that you are in the pre-diabetic range (5.7 to 6.4). Hemoglobin A1C is a measure of how well-controlled your blood sugars are over the last 3 months. Please ensure that you are having a healthy diet and lifestyle in order to keep your HbA1c levels under control.    Diagnosis: HLD (hyperlipidemia)  Assessment and Plan of Treatment: Please continue taking Rosuvastatin to keep your lipid levels under control and follow up with your Primary care doctor upon hospital discharge.

## 2021-02-24 NOTE — DISCHARGE NOTE PROVIDER - ATTENDING COMMENTS
Patient and examined this morning, vitals and labs reviewed, plan of care discussed w/ medical team. Patient was admitted for mild hypoxia due to covid-19 infection, he was started on dexamethasone and his O2 sats were monitored, which rapidly normalized, he remained in the high 90s throughout his hospital stay and on the day of discharge was 100% on ambulation on room air. Patient is stable for discharge home, will discontinue dexamethasone as he is not requiring oxygen. He is continue isolation until 2/27.    Rest as per hospital course.

## 2023-04-11 NOTE — H&P ADULT - NEUROLOGICAL DETAILS
Detail Level: Detailed Detail Level: Zone alert and oriented x 3/responds to pain/responds to verbal commands

## 2023-10-20 NOTE — ED ADULT TRIAGE NOTE - WEIGHT IN LBS
Detail Level: Detailed
Quality 226: Preventive Care And Screening: Tobacco Use: Screening And Cessation Intervention: Patient screened for tobacco use and is an ex/non-smoker
Quality 110: Preventive Care And Screening: Influenza Immunization: Influenza Immunization previously received during influenza season
154.9
Quality 130: Documentation Of Current Medications In The Medical Record: Current Medications Documented
Quality 431: Preventive Care And Screening: Unhealthy Alcohol Use - Screening: Patient not identified as an unhealthy alcohol user when screened for unhealthy alcohol use using a systematic screening method
Quality 128: Preventive Care And Screening: Body Mass Index (Bmi) Screening And Follow-Up Plan: BMI is documented within normal parameters and no follow-up plan is required.
Quality 134: Screening For Clinical Depression And Follow-Up Plan: The patient was screened for depression and the screen was negative and no follow up required

## 2024-08-19 NOTE — H&P ADULT - PROBLEM SELECTOR PROBLEM 8
[Average] : average [Cooperative] : cooperative [Euthymic] : euthymic [Labile] : labile [Clear] : clear [Linear/Goal Directed] : linear/goal directed [WNL] : within normal limits Need for prophylactic measure

## 2024-12-04 NOTE — ED ADULT TRIAGE NOTE - MODE OF ARRIVAL
RX PROGRESS NOTE: Vancomycin Therapeutic Drug Monitoring    Day of therapy: 2    Indication and target trough: Sepsis (10-15 mcg/mL)    Current vancomycin dosing regimen: 1500 mg IVPB x1    Most recent height and weight information:  Weight: 66.9 kg (12/04/24 0452)  Height: 5' (152.4 cm) (12/02/24 2300)    The Following are the Calculated  Current Weights for Dawn Marino       Adjusted Ideal    54.1 kg 45.5 kg            Labs:  Serum Creatinine and Creatinine Clearance:  Serum creatinine: 1.43 mg/dL (H) 12/04/24 0303  Estimated creatinine clearance: 28.1 mL/min (A)    Vancomycin Serum Concentrations:  Vancomycin, Random (mcg/mL)   Date/Time Value   12/04/2024 0303 13.6       Assessment:  Serum concentration of 13.6 mcg/mL after the loading dose. Based on the serum concentration and renal function, will adjust regimen to vancomycin 750 mg IVPB every 24 hours while on CRRT.    Additional serum concentrations may be necessary depending on pathogen identified, risk factors for adverse events, and/or duration of therapy.  Pharmacy will continue to follow and adjust as needed.    Thank you,    Kiana Cordero RPH  12/4/2024 8:30 AM     Walk in
